# Patient Record
Sex: MALE | Race: WHITE | NOT HISPANIC OR LATINO | Employment: FULL TIME | URBAN - METROPOLITAN AREA
[De-identification: names, ages, dates, MRNs, and addresses within clinical notes are randomized per-mention and may not be internally consistent; named-entity substitution may affect disease eponyms.]

---

## 2021-02-10 ENCOUNTER — HOSPITAL ENCOUNTER (OUTPATIENT)
Dept: LAB | Age: 66
Discharge: HOME | End: 2021-02-10
Attending: ORTHOPAEDIC SURGERY

## 2021-02-10 DIAGNOSIS — G56.01 CARPAL TUNNEL SYNDROME ON RIGHT: Primary | ICD-10-CM

## 2021-02-10 DIAGNOSIS — G56.02 CARPAL TUNNEL SYNDROME ON LEFT: ICD-10-CM

## 2021-02-10 LAB
CRP SERPL-MCNC: <0.3 MG/DL
DEPRECATED RDW RBC: 47.2 FL (ref 39–50)
ERYTHROCYTE [DISTWIDTH] IN BLOOD: 13.6 % (ref 11–15)
ERYTHROCYTE [SEDIMENTATION RATE] IN BLOOD BY WESTERGREN METHOD: 23 MM/HR (ref 0–20)
HCT VFR BLD CALC: 45.6 % (ref 39–51)
HGB BLD-MCNC: 14.4 G/DL (ref 13–17)
MCH RBC QN AUTO: 29.8 PG (ref 26–34)
MCHC RBC AUTO-ENTMCNC: 31.6 G/DL (ref 32–36.5)
MCV RBC AUTO: 94.4 FL (ref 78–100)
NRBC BLD MANUAL-RTO: 0 /100 WBC
PLATELET # BLD AUTO: 181 K/MCL (ref 140–450)
RBC # BLD: 4.83 MIL/MCL (ref 4.5–5.9)
RHEUMATOID FACT SER NEPH-ACNC: <10 UNITS/ML
TSH SERPL-ACNC: 2.23 MCUNITS/ML (ref 0.35–5)
WBC # BLD: 6.8 K/MCL (ref 4.2–11)

## 2021-02-10 PROCEDURE — 86431 RHEUMATOID FACTOR QUANT: CPT | Performed by: ORTHOPAEDIC SURGERY

## 2021-02-10 PROCEDURE — 36415 COLL VENOUS BLD VENIPUNCTURE: CPT | Performed by: ORTHOPAEDIC SURGERY

## 2021-02-10 PROCEDURE — 85027 COMPLETE CBC AUTOMATED: CPT | Performed by: ORTHOPAEDIC SURGERY

## 2021-02-10 PROCEDURE — 86060 ANTISTREPTOLYSIN O TITER: CPT | Performed by: ORTHOPAEDIC SURGERY

## 2021-02-10 PROCEDURE — 84443 ASSAY THYROID STIM HORMONE: CPT | Performed by: ORTHOPAEDIC SURGERY

## 2021-02-10 PROCEDURE — 86618 LYME DISEASE ANTIBODY: CPT | Performed by: ORTHOPAEDIC SURGERY

## 2021-02-10 PROCEDURE — 81374 HLA I TYPING 1 ANTIGEN LR: CPT | Performed by: ORTHOPAEDIC SURGERY

## 2021-02-10 PROCEDURE — 86038 ANTINUCLEAR ANTIBODIES: CPT | Performed by: ORTHOPAEDIC SURGERY

## 2021-02-10 PROCEDURE — 86200 CCP ANTIBODY: CPT | Performed by: ORTHOPAEDIC SURGERY

## 2021-02-10 PROCEDURE — 86140 C-REACTIVE PROTEIN: CPT | Performed by: ORTHOPAEDIC SURGERY

## 2021-02-10 PROCEDURE — 85652 RBC SED RATE AUTOMATED: CPT | Performed by: ORTHOPAEDIC SURGERY

## 2021-02-11 LAB
B BURGDOR IGG+IGM SER QL IA: NEGATIVE
DATE OF ANALYSIS SPEC: NORMAL
HLA-B27 RELATED AG QL: NEGATIVE
SERVICE CMNT-IMP: NORMAL

## 2021-02-12 LAB
ANA SER QL IA: NEGATIVE
ASO AB TITR SER LA: <100 IUNITS/ML
CCP AB SER IA-ACNC: 13 UNITS

## 2021-02-22 ENCOUNTER — TELEPHONE (OUTPATIENT)
Dept: SCHEDULING | Age: 66
End: 2021-02-22

## 2021-03-14 ENCOUNTER — APPOINTMENT (OUTPATIENT)
Dept: LAB | Age: 66
End: 2021-03-14

## 2021-03-27 ENCOUNTER — HOSPITAL ENCOUNTER (OUTPATIENT)
Dept: ULTRASOUND IMAGING | Age: 66
Discharge: HOME | End: 2021-03-27
Attending: FAMILY MEDICINE

## 2021-03-27 DIAGNOSIS — R10.9 ABDOMINAL PAIN: ICD-10-CM

## 2021-03-27 PROCEDURE — 76705 ECHO EXAM OF ABDOMEN: CPT

## 2021-10-17 ENCOUNTER — APPOINTMENT (OUTPATIENT)
Dept: CT IMAGING | Age: 66
End: 2021-10-17
Attending: EMERGENCY MEDICINE

## 2021-10-17 ENCOUNTER — HOSPITAL ENCOUNTER (OUTPATIENT)
Age: 66
Setting detail: OBSERVATION
Discharge: HOME | End: 2021-10-18
Attending: EMERGENCY MEDICINE | Admitting: INTERNAL MEDICINE

## 2021-10-17 ENCOUNTER — APPOINTMENT (OUTPATIENT)
Dept: CT IMAGING | Age: 66
End: 2021-10-17
Attending: STUDENT IN AN ORGANIZED HEALTH CARE EDUCATION/TRAINING PROGRAM

## 2021-10-17 DIAGNOSIS — S01.01XA LACERATION OF SCALP, INITIAL ENCOUNTER: Primary | ICD-10-CM

## 2021-10-17 DIAGNOSIS — S06.5XAA SUBDURAL HEMATOMA (CMD): ICD-10-CM

## 2021-10-17 DIAGNOSIS — S06.4XAA EPIDURAL HEMATOMA (CMD): ICD-10-CM

## 2021-10-17 LAB
ABO + RH BLD: NORMAL
ALBUMIN SERPL-MCNC: 3.9 G/DL (ref 3.6–5.1)
ALBUMIN/GLOB SERPL: 1 {RATIO} (ref 1–2.4)
ALP SERPL-CCNC: 93 UNITS/L (ref 45–117)
ALT SERPL-CCNC: 49 UNITS/L
ANION GAP SERPL CALC-SCNC: 11 MMOL/L (ref 10–20)
APTT PPP: 24 SEC (ref 22–30)
AST SERPL-CCNC: 30 UNITS/L
BASOPHILS # BLD: 0 K/MCL (ref 0–0.3)
BASOPHILS NFR BLD: 0 %
BILIRUB SERPL-MCNC: 0.5 MG/DL (ref 0.2–1)
BLD GP AB SCN SERPL QL GEL: NEGATIVE
BUN SERPL-MCNC: 21 MG/DL (ref 6–20)
BUN/CREAT SERPL: 25 (ref 7–25)
CALCIUM SERPL-MCNC: 9.3 MG/DL (ref 8.4–10.2)
CHLORIDE SERPL-SCNC: 100 MMOL/L (ref 98–107)
CO2 SERPL-SCNC: 27 MMOL/L (ref 21–32)
CREAT SERPL-MCNC: 0.85 MG/DL (ref 0.67–1.17)
DEPRECATED RDW RBC: 44.6 FL (ref 39–50)
EOSINOPHIL # BLD: 0.2 K/MCL (ref 0–0.5)
EOSINOPHIL NFR BLD: 2 %
ERYTHROCYTE [DISTWIDTH] IN BLOOD: 13.1 % (ref 11–15)
FASTING DURATION TIME PATIENT: ABNORMAL H
GFR SERPLBLD BASED ON 1.73 SQ M-ARVRAT: 90 ML/MIN
GLOBULIN SER-MCNC: 3.9 G/DL (ref 2–4)
GLUCOSE BLDC GLUCOMTR-MCNC: 154 MG/DL (ref 70–99)
GLUCOSE SERPL-MCNC: 220 MG/DL (ref 70–99)
HBA1C MFR BLD: 7.5 % (ref 4.5–5.6)
HCT VFR BLD CALC: 48.2 % (ref 39–51)
HGB BLD-MCNC: 15.8 G/DL (ref 13–17)
IMM GRANULOCYTES # BLD AUTO: 0.1 K/MCL (ref 0–0.2)
IMM GRANULOCYTES # BLD: 1 %
INR PPP: 1
LYMPHOCYTES # BLD: 1 K/MCL (ref 1–4)
LYMPHOCYTES NFR BLD: 11 %
MCH RBC QN AUTO: 30.6 PG (ref 26–34)
MCHC RBC AUTO-ENTMCNC: 32.8 G/DL (ref 32–36.5)
MCV RBC AUTO: 93.4 FL (ref 78–100)
MONOCYTES # BLD: 0.6 K/MCL (ref 0.3–0.9)
MONOCYTES NFR BLD: 7 %
NEUTROPHILS # BLD: 6.7 K/MCL (ref 1.8–7.7)
NEUTROPHILS NFR BLD: 79 %
NRBC BLD MANUAL-RTO: 0 /100 WBC
PLATELET # BLD AUTO: 167 K/MCL (ref 140–450)
POTASSIUM SERPL-SCNC: 4.3 MMOL/L (ref 3.4–5.1)
PROT SERPL-MCNC: 7.8 G/DL (ref 6.4–8.2)
PROTHROMBIN TIME: 10.7 SEC (ref 9.7–11.8)
RAINBOW EXTRA TUBES HOLD SPECIMEN: NORMAL
RBC # BLD: 5.16 MIL/MCL (ref 4.5–5.9)
SARS-COV-2 RNA RESP QL NAA+PROBE: NOT DETECTED
SERVICE CMNT-IMP: NORMAL
SERVICE CMNT-IMP: NORMAL
SODIUM SERPL-SCNC: 134 MMOL/L (ref 135–145)
TYPE AND SCREEN EXPIRATION DATE: NORMAL
WBC # BLD: 8.5 K/MCL (ref 4.2–11)

## 2021-10-17 PROCEDURE — 99284 EMERGENCY DEPT VISIT MOD MDM: CPT

## 2021-10-17 PROCEDURE — 12004 RPR S/N/AX/GEN/TRK7.6-12.5CM: CPT | Performed by: STUDENT IN AN ORGANIZED HEALTH CARE EDUCATION/TRAINING PROGRAM

## 2021-10-17 PROCEDURE — 36415 COLL VENOUS BLD VENIPUNCTURE: CPT

## 2021-10-17 PROCEDURE — 85730 THROMBOPLASTIN TIME PARTIAL: CPT | Performed by: STUDENT IN AN ORGANIZED HEALTH CARE EDUCATION/TRAINING PROGRAM

## 2021-10-17 PROCEDURE — 90471 IMMUNIZATION ADMIN: CPT | Performed by: STUDENT IN AN ORGANIZED HEALTH CARE EDUCATION/TRAINING PROGRAM

## 2021-10-17 PROCEDURE — 70450 CT HEAD/BRAIN W/O DYE: CPT

## 2021-10-17 PROCEDURE — 85610 PROTHROMBIN TIME: CPT | Performed by: STUDENT IN AN ORGANIZED HEALTH CARE EDUCATION/TRAINING PROGRAM

## 2021-10-17 PROCEDURE — 83036 HEMOGLOBIN GLYCOSYLATED A1C: CPT | Performed by: INTERNAL MEDICINE

## 2021-10-17 PROCEDURE — 99220 INITIAL OBSERVATION CARE,LEVL III: CPT | Performed by: INTERNAL MEDICINE

## 2021-10-17 PROCEDURE — 10002800 HB RX 250 W HCPCS: Performed by: STUDENT IN AN ORGANIZED HEALTH CARE EDUCATION/TRAINING PROGRAM

## 2021-10-17 PROCEDURE — G1004 CDSM NDSC: HCPCS

## 2021-10-17 PROCEDURE — 85025 COMPLETE CBC W/AUTO DIFF WBC: CPT | Performed by: STUDENT IN AN ORGANIZED HEALTH CARE EDUCATION/TRAINING PROGRAM

## 2021-10-17 PROCEDURE — G0378 HOSPITAL OBSERVATION PER HR: HCPCS

## 2021-10-17 PROCEDURE — 99205 OFFICE O/P NEW HI 60 MIN: CPT | Performed by: PHYSICIAN ASSISTANT

## 2021-10-17 PROCEDURE — 82962 GLUCOSE BLOOD TEST: CPT

## 2021-10-17 PROCEDURE — 90715 TDAP VACCINE 7 YRS/> IM: CPT | Performed by: STUDENT IN AN ORGANIZED HEALTH CARE EDUCATION/TRAINING PROGRAM

## 2021-10-17 PROCEDURE — 12004 RPR S/N/AX/GEN/TRK7.6-12.5CM: CPT

## 2021-10-17 PROCEDURE — 10002803 HB RX 637: Performed by: INTERNAL MEDICINE

## 2021-10-17 PROCEDURE — 10004651 HB RX, NO CHARGE ITEM: Performed by: INTERNAL MEDICINE

## 2021-10-17 PROCEDURE — 99284 EMERGENCY DEPT VISIT MOD MDM: CPT | Performed by: STUDENT IN AN ORGANIZED HEALTH CARE EDUCATION/TRAINING PROGRAM

## 2021-10-17 PROCEDURE — 80053 COMPREHEN METABOLIC PANEL: CPT | Performed by: STUDENT IN AN ORGANIZED HEALTH CARE EDUCATION/TRAINING PROGRAM

## 2021-10-17 PROCEDURE — U0003 INFECTIOUS AGENT DETECTION BY NUCLEIC ACID (DNA OR RNA); SEVERE ACUTE RESPIRATORY SYNDROME CORONAVIRUS 2 (SARS-COV-2) (CORONAVIRUS DISEASE [COVID-19]), AMPLIFIED PROBE TECHNIQUE, MAKING USE OF HIGH THROUGHPUT TECHNOLOGIES AS DESCRIBED BY CMS-2020-01-R: HCPCS | Performed by: STUDENT IN AN ORGANIZED HEALTH CARE EDUCATION/TRAINING PROGRAM

## 2021-10-17 PROCEDURE — 86850 RBC ANTIBODY SCREEN: CPT | Performed by: STUDENT IN AN ORGANIZED HEALTH CARE EDUCATION/TRAINING PROGRAM

## 2021-10-17 RX ORDER — EZETIMIBE 10 MG/1
10 TABLET ORAL AT BEDTIME
COMMUNITY

## 2021-10-17 RX ORDER — DEXTROSE MONOHYDRATE 25 G/50ML
12.5 INJECTION, SOLUTION INTRAVENOUS PRN
Status: DISCONTINUED | OUTPATIENT
Start: 2021-10-17 | End: 2021-10-18 | Stop reason: HOSPADM

## 2021-10-17 RX ORDER — METOPROLOL SUCCINATE 100 MG/1
100 TABLET, EXTENDED RELEASE ORAL DAILY
Status: DISCONTINUED | OUTPATIENT
Start: 2021-10-17 | End: 2021-10-18 | Stop reason: HOSPADM

## 2021-10-17 RX ORDER — DEXTROSE MONOHYDRATE 25 G/50ML
25 INJECTION, SOLUTION INTRAVENOUS PRN
Status: DISCONTINUED | OUTPATIENT
Start: 2021-10-17 | End: 2021-10-18 | Stop reason: HOSPADM

## 2021-10-17 RX ORDER — NICOTINE POLACRILEX 4 MG
15 LOZENGE BUCCAL PRN
Status: DISCONTINUED | OUTPATIENT
Start: 2021-10-17 | End: 2021-10-18 | Stop reason: HOSPADM

## 2021-10-17 RX ORDER — 0.9 % SODIUM CHLORIDE 0.9 %
2 VIAL (ML) INJECTION EVERY 12 HOURS SCHEDULED
Status: DISCONTINUED | OUTPATIENT
Start: 2021-10-17 | End: 2021-10-18 | Stop reason: HOSPADM

## 2021-10-17 RX ORDER — LIDOCAINE HYDROCHLORIDE AND EPINEPHRINE 10; 10 MG/ML; UG/ML
10 INJECTION, SOLUTION INFILTRATION; PERINEURAL ONCE
Status: DISCONTINUED | OUTPATIENT
Start: 2021-10-17 | End: 2021-10-18 | Stop reason: HOSPADM

## 2021-10-17 RX ORDER — METOPROLOL SUCCINATE 100 MG/1
100 TABLET, EXTENDED RELEASE ORAL DAILY
COMMUNITY

## 2021-10-17 RX ORDER — EZETIMIBE 10 MG/1
10 TABLET ORAL AT BEDTIME
Status: DISCONTINUED | OUTPATIENT
Start: 2021-10-17 | End: 2021-10-18 | Stop reason: HOSPADM

## 2021-10-17 RX ORDER — LISINOPRIL 20 MG/1
20 TABLET ORAL DAILY
COMMUNITY

## 2021-10-17 RX ORDER — NICOTINE POLACRILEX 4 MG
30 LOZENGE BUCCAL PRN
Status: DISCONTINUED | OUTPATIENT
Start: 2021-10-17 | End: 2021-10-18 | Stop reason: HOSPADM

## 2021-10-17 RX ORDER — ASPIRIN 81 MG/1
81 TABLET, CHEWABLE ORAL DAILY
Status: ON HOLD | COMMUNITY
End: 2021-10-18 | Stop reason: HOSPADM

## 2021-10-17 RX ORDER — ACETAMINOPHEN 325 MG/1
650 TABLET ORAL EVERY 4 HOURS PRN
Status: DISCONTINUED | OUTPATIENT
Start: 2021-10-17 | End: 2021-10-18 | Stop reason: HOSPADM

## 2021-10-17 RX ORDER — HYDRALAZINE HYDROCHLORIDE 20 MG/ML
10 INJECTION INTRAMUSCULAR; INTRAVENOUS EVERY 6 HOURS PRN
Status: DISCONTINUED | OUTPATIENT
Start: 2021-10-17 | End: 2021-10-18 | Stop reason: HOSPADM

## 2021-10-17 RX ORDER — LISINOPRIL 20 MG/1
20 TABLET ORAL DAILY
Status: DISCONTINUED | OUTPATIENT
Start: 2021-10-17 | End: 2021-10-18 | Stop reason: HOSPADM

## 2021-10-17 RX ORDER — HYDROCODONE BITARTRATE AND ACETAMINOPHEN 5; 325 MG/1; MG/1
1 TABLET ORAL EVERY 4 HOURS PRN
Status: DISCONTINUED | OUTPATIENT
Start: 2021-10-17 | End: 2021-10-18 | Stop reason: HOSPADM

## 2021-10-17 RX ORDER — FLUOCINONIDE 0.5 MG/G
1 OINTMENT TOPICAL 2 TIMES DAILY
COMMUNITY

## 2021-10-17 RX ADMIN — SODIUM CHLORIDE, PRESERVATIVE FREE 2 ML: 5 INJECTION INTRAVENOUS at 18:16

## 2021-10-17 RX ADMIN — SODIUM CHLORIDE, PRESERVATIVE FREE 2 ML: 5 INJECTION INTRAVENOUS at 20:50

## 2021-10-17 RX ADMIN — TETANUS TOXOID, REDUCED DIPHTHERIA TOXOID AND ACELLULAR PERTUSSIS VACCINE, ADSORBED 0.5 ML: 5; 2.5; 8; 8; 2.5 SUSPENSION INTRAMUSCULAR at 10:45

## 2021-10-17 RX ADMIN — HYDROCODONE BITARTRATE AND ACETAMINOPHEN 1 TABLET: 5; 325 TABLET ORAL at 23:45

## 2021-10-17 RX ADMIN — HYDROCODONE BITARTRATE AND ACETAMINOPHEN 1 TABLET: 5; 325 TABLET ORAL at 17:00

## 2021-10-17 RX ADMIN — ACETAMINOPHEN 650 MG: 325 TABLET ORAL at 14:45

## 2021-10-17 RX ADMIN — EZETIMIBE 10 MG: 10 TABLET ORAL at 20:47

## 2021-10-17 ASSESSMENT — COLUMBIA-SUICIDE SEVERITY RATING SCALE - C-SSRS
2. HAVE YOU ACTUALLY HAD ANY THOUGHTS OF KILLING YOURSELF?: NO
IS THE PATIENT ABLE TO COMPLETE C-SSRS: YES
1. WITHIN THE PAST MONTH, HAVE YOU WISHED YOU WERE DEAD OR WISHED YOU COULD GO TO SLEEP AND NOT WAKE UP?: NO
6. HAVE YOU EVER DONE ANYTHING, STARTED TO DO ANYTHING, OR PREPARED TO DO ANYTHING TO END YOUR LIFE?: NO

## 2021-10-17 ASSESSMENT — ENCOUNTER SYMPTOMS
NAUSEA: 0
BRUISES/BLEEDS EASILY: 0
DIZZINESS: 0
EYE PAIN: 0
ACTIVITY CHANGE: 0
PHOTOPHOBIA: 0
DIARRHEA: 0
WOUND: 1
FATIGUE: 0
CHILLS: 0
VOMITING: 0
SORE THROAT: 0
ROS SKIN COMMENTS: LACERATION
CHEST TIGHTNESS: 0
AGITATION: 0
FEVER: 0
CONFUSION: 0
HEADACHES: 1
ABDOMINAL PAIN: 0
CONSTIPATION: 0
NUMBNESS: 0
COLOR CHANGE: 0
WHEEZING: 0
SLEEP DISTURBANCE: 0
SHORTNESS OF BREATH: 0
BACK PAIN: 0
HEADACHES: 0
WEAKNESS: 0

## 2021-10-17 ASSESSMENT — LIFESTYLE VARIABLES
ALCOHOL_USE_STATUS: NO OR LOW RISK WITH VALIDATED TOOL
HOW MANY STANDARD DRINKS CONTAINING ALCOHOL DO YOU HAVE ON A TYPICAL DAY: 0,1 OR 2
AUDIT-C TOTAL SCORE: 0
HOW OFTEN DO YOU HAVE A DRINK CONTAINING ALCOHOL: NEVER
HOW OFTEN DO YOU HAVE 6 OR MORE DRINKS ON ONE OCCASION: NEVER

## 2021-10-17 ASSESSMENT — PATIENT HEALTH QUESTIONNAIRE - PHQ9
2. FEELING DOWN, DEPRESSED OR HOPELESS: NOT AT ALL
SUM OF ALL RESPONSES TO PHQ9 QUESTIONS 1 AND 2: 0
CLINICAL INTERPRETATION OF PHQ2 SCORE: NO FURTHER SCREENING NEEDED
IS PATIENT ABLE TO COMPLETE PHQ2 OR PHQ9: YES
CLINICAL INTERPRETATION OF PHQ9 SCORE: NO FURTHER SCREENING NEEDED
SUM OF ALL RESPONSES TO PHQ9 QUESTIONS 1 AND 2: 0
1. LITTLE INTEREST OR PLEASURE IN DOING THINGS: NOT AT ALL

## 2021-10-17 ASSESSMENT — COGNITIVE AND FUNCTIONAL STATUS - GENERAL
DO YOU HAVE SERIOUS DIFFICULTY WALKING OR CLIMBING STAIRS: NO
BECAUSE OF A PHYSICAL, MENTAL, OR EMOTIONAL CONDITION, DO YOU HAVE SERIOUS DIFFICULTY CONCENTRATING, REMEMBERING OR MAKING DECISIONS: NO
ARE YOU DEAF OR DO YOU HAVE SERIOUS DIFFICULTY  HEARING: NO
ARE YOU BLIND OR DO YOU HAVE SERIOUS DIFFICULTY SEEING, EVEN WHEN WEARING GLASSES: NO
DO YOU HAVE DIFFICULTY DRESSING OR BATHING: NO
BECAUSE OF A PHYSICAL, MENTAL, OR EMOTIONAL CONDITION, DO YOU HAVE DIFFICULTY DOING ERRANDS ALONE: NO

## 2021-10-17 ASSESSMENT — PAIN SCALES - GENERAL
PAINLEVEL_OUTOF10: 3
PAINLEVEL_OUTOF10: 6
PAINLEVEL_OUTOF10: 4
PAINLEVEL_OUTOF10: 0
PAINLEVEL_OUTOF10: 5
PAINLEVEL_OUTOF10: 6
PAINLEVEL_OUTOF10: 5

## 2021-10-17 ASSESSMENT — ACTIVITIES OF DAILY LIVING (ADL)
ADL_SHORT_OF_BREATH: NO
ADL_BEFORE_ADMISSION: INDEPENDENT
ADL_SCORE: 12
RECENT_DECLINE_ADL: NO
CHRONIC_PAIN_PRESENT: NO

## 2021-10-18 VITALS
HEIGHT: 65 IN | OXYGEN SATURATION: 92 % | WEIGHT: 182.1 LBS | TEMPERATURE: 98.4 F | DIASTOLIC BLOOD PRESSURE: 75 MMHG | RESPIRATION RATE: 18 BRPM | SYSTOLIC BLOOD PRESSURE: 122 MMHG | HEART RATE: 72 BPM | BODY MASS INDEX: 30.34 KG/M2

## 2021-10-18 DIAGNOSIS — S06.5XAA SDH (SUBDURAL HEMATOMA) (CMD): Primary | ICD-10-CM

## 2021-10-18 PROBLEM — S01.01XA SCALP LACERATION: Status: ACTIVE | Noted: 2021-10-18

## 2021-10-18 PROBLEM — S06.4XAA EPIDURAL HEMATOMA (CMD): Status: ACTIVE | Noted: 2021-10-18

## 2021-10-18 PROBLEM — E11.9 DIABETES MELLITUS, NEW ONSET (CMD): Status: ACTIVE | Noted: 2021-10-18

## 2021-10-18 PROBLEM — S06.4XAA EPIDURAL HEMATOMA (CMD): Status: RESOLVED | Noted: 2021-10-18 | Resolved: 2021-10-18

## 2021-10-18 PROBLEM — I10 ESSENTIAL HYPERTENSION, BENIGN: Status: ACTIVE | Noted: 2021-10-18

## 2021-10-18 LAB
BASOPHILS # BLD: 0 K/MCL (ref 0–0.3)
BASOPHILS NFR BLD: 0 %
DEPRECATED RDW RBC: 44.1 FL (ref 39–50)
EOSINOPHIL # BLD: 0.2 K/MCL (ref 0–0.5)
EOSINOPHIL NFR BLD: 2 %
ERYTHROCYTE [DISTWIDTH] IN BLOOD: 13.2 % (ref 11–15)
GLUCOSE BLDC GLUCOMTR-MCNC: 134 MG/DL (ref 70–99)
GLUCOSE BLDC GLUCOMTR-MCNC: 200 MG/DL (ref 70–99)
HCT VFR BLD CALC: 41.8 % (ref 39–51)
HGB BLD-MCNC: 13.8 G/DL (ref 13–17)
IMM GRANULOCYTES # BLD AUTO: 0 K/MCL (ref 0–0.2)
IMM GRANULOCYTES # BLD: 1 %
LYMPHOCYTES # BLD: 1.1 K/MCL (ref 1–4)
LYMPHOCYTES NFR BLD: 14 %
MCH RBC QN AUTO: 30.4 PG (ref 26–34)
MCHC RBC AUTO-ENTMCNC: 33 G/DL (ref 32–36.5)
MCV RBC AUTO: 92.1 FL (ref 78–100)
MONOCYTES # BLD: 0.6 K/MCL (ref 0.3–0.9)
MONOCYTES NFR BLD: 8 %
NEUTROPHILS # BLD: 5.9 K/MCL (ref 1.8–7.7)
NEUTROPHILS NFR BLD: 75 %
NRBC BLD MANUAL-RTO: 0 /100 WBC
PLATELET # BLD AUTO: 154 K/MCL (ref 140–450)
RAINBOW EXTRA TUBES HOLD SPECIMEN: NORMAL
RBC # BLD: 4.54 MIL/MCL (ref 4.5–5.9)
WBC # BLD: 7.9 K/MCL (ref 4.2–11)

## 2021-10-18 PROCEDURE — 36415 COLL VENOUS BLD VENIPUNCTURE: CPT | Performed by: INTERNAL MEDICINE

## 2021-10-18 PROCEDURE — 10004651 HB RX, NO CHARGE ITEM: Performed by: INTERNAL MEDICINE

## 2021-10-18 PROCEDURE — 85025 COMPLETE CBC W/AUTO DIFF WBC: CPT | Performed by: INTERNAL MEDICINE

## 2021-10-18 PROCEDURE — 10002803 HB RX 637: Performed by: STUDENT IN AN ORGANIZED HEALTH CARE EDUCATION/TRAINING PROGRAM

## 2021-10-18 PROCEDURE — 99217 OBSERVATION CARE DISCHARGE: CPT | Performed by: STUDENT IN AN ORGANIZED HEALTH CARE EDUCATION/TRAINING PROGRAM

## 2021-10-18 PROCEDURE — 99225 SUBSEQUENT OBSERVATION CARE LEVEL II: CPT | Performed by: PHYSICIAN ASSISTANT

## 2021-10-18 PROCEDURE — 10002803 HB RX 637: Performed by: INTERNAL MEDICINE

## 2021-10-18 PROCEDURE — G0378 HOSPITAL OBSERVATION PER HR: HCPCS

## 2021-10-18 PROCEDURE — 82962 GLUCOSE BLOOD TEST: CPT

## 2021-10-18 RX ORDER — ACETAMINOPHEN 325 MG/1
650 TABLET ORAL EVERY 4 HOURS PRN
Qty: 30 TABLET | Refills: 0 | Status: SHIPPED | OUTPATIENT
Start: 2021-10-18

## 2021-10-18 RX ADMIN — METOPROLOL SUCCINATE 100 MG: 100 TABLET, EXTENDED RELEASE ORAL at 08:25

## 2021-10-18 RX ADMIN — LISINOPRIL 20 MG: 20 TABLET ORAL at 08:25

## 2021-10-18 RX ADMIN — SODIUM CHLORIDE, PRESERVATIVE FREE 2 ML: 5 INJECTION INTRAVENOUS at 08:26

## 2021-10-18 RX ADMIN — METFORMIN HYDROCHLORIDE 500 MG: 500 TABLET, FILM COATED ORAL at 10:04

## 2021-10-18 ASSESSMENT — ENCOUNTER SYMPTOMS
CHILLS: 0
VOMITING: 0
BACK PAIN: 0
WEAKNESS: 0
NUMBNESS: 0
FEVER: 0
SHORTNESS OF BREATH: 0
NAUSEA: 0
WOUND: 1
HEADACHES: 0

## 2021-10-18 ASSESSMENT — PAIN SCALES - GENERAL: PAINLEVEL_OUTOF10: 0

## 2021-10-23 ENCOUNTER — TELEPHONE (OUTPATIENT)
Dept: SCHEDULING | Age: 66
End: 2021-10-23

## 2021-10-26 ENCOUNTER — TELEPHONE (OUTPATIENT)
Dept: NEUROSURGERY | Age: 66
End: 2021-10-26

## 2021-10-28 ENCOUNTER — HOSPITAL ENCOUNTER (OUTPATIENT)
Dept: LAB | Age: 66
Discharge: HOME | End: 2021-10-28
Attending: ORTHOPAEDIC SURGERY

## 2021-10-28 DIAGNOSIS — L40.9 PSORIASIS: ICD-10-CM

## 2021-10-28 DIAGNOSIS — Z79.899 DRUG THERAPY: ICD-10-CM

## 2021-10-28 DIAGNOSIS — L40.9 PSORIASIS: Primary | ICD-10-CM

## 2021-10-28 LAB
ALBUMIN SERPL-MCNC: 3.8 G/DL (ref 3.6–5.1)
ALBUMIN/GLOB SERPL: 1 {RATIO} (ref 1–2.4)
ALP SERPL-CCNC: 75 UNITS/L (ref 45–117)
ALT SERPL-CCNC: 34 UNITS/L
ANION GAP SERPL CALC-SCNC: 8 MMOL/L (ref 10–20)
AST SERPL-CCNC: 24 UNITS/L
BASOPHILS # BLD: 0 K/MCL (ref 0–0.3)
BASOPHILS NFR BLD: 0 %
BILIRUB SERPL-MCNC: 0.3 MG/DL (ref 0.2–1)
BUN SERPL-MCNC: 26 MG/DL (ref 6–20)
BUN/CREAT SERPL: 31 (ref 7–25)
CALCIUM SERPL-MCNC: 9.4 MG/DL (ref 8.4–10.2)
CHLORIDE SERPL-SCNC: 104 MMOL/L (ref 98–107)
CO2 SERPL-SCNC: 29 MMOL/L (ref 21–32)
CREAT SERPL-MCNC: 0.85 MG/DL (ref 0.67–1.17)
DEPRECATED RDW RBC: 44.9 FL (ref 39–50)
EOSINOPHIL # BLD: 0.2 K/MCL (ref 0–0.5)
EOSINOPHIL NFR BLD: 3 %
ERYTHROCYTE [DISTWIDTH] IN BLOOD: 13.1 % (ref 11–15)
FASTING DURATION TIME PATIENT: ABNORMAL H
GFR SERPLBLD BASED ON 1.73 SQ M-ARVRAT: 90 ML/MIN
GLOBULIN SER-MCNC: 4 G/DL (ref 2–4)
GLUCOSE SERPL-MCNC: 125 MG/DL (ref 70–99)
HBV CORE IGM SER QL: NEGATIVE
HBV SURFACE AB SER QL: NEGATIVE
HBV SURFACE AG SER QL: NEGATIVE
HCT VFR BLD CALC: 42.7 % (ref 39–51)
HCV AB SER QL: NEGATIVE
HGB BLD-MCNC: 14 G/DL (ref 13–17)
IMM GRANULOCYTES # BLD AUTO: 0 K/MCL (ref 0–0.2)
IMM GRANULOCYTES # BLD: 0 %
LYMPHOCYTES # BLD: 1.2 K/MCL (ref 1–4)
LYMPHOCYTES NFR BLD: 17 %
MCH RBC QN AUTO: 30.8 PG (ref 26–34)
MCHC RBC AUTO-ENTMCNC: 32.8 G/DL (ref 32–36.5)
MCV RBC AUTO: 94.1 FL (ref 78–100)
MONOCYTES # BLD: 0.5 K/MCL (ref 0.3–0.9)
MONOCYTES NFR BLD: 7 %
NEUTROPHILS # BLD: 4.8 K/MCL (ref 1.8–7.7)
NEUTROPHILS NFR BLD: 73 %
NRBC BLD MANUAL-RTO: 0 /100 WBC
PLATELET # BLD AUTO: 196 K/MCL (ref 140–450)
POTASSIUM SERPL-SCNC: 4.8 MMOL/L (ref 3.4–5.1)
PROT SERPL-MCNC: 7.8 G/DL (ref 6.4–8.2)
RBC # BLD: 4.54 MIL/MCL (ref 4.5–5.9)
SODIUM SERPL-SCNC: 136 MMOL/L (ref 135–145)
WBC # BLD: 6.7 K/MCL (ref 4.2–11)

## 2021-10-28 PROCEDURE — 86705 HEP B CORE ANTIBODY IGM: CPT | Performed by: DERMATOLOGY

## 2021-10-28 PROCEDURE — 87340 HEPATITIS B SURFACE AG IA: CPT | Performed by: DERMATOLOGY

## 2021-10-28 PROCEDURE — 36415 COLL VENOUS BLD VENIPUNCTURE: CPT | Performed by: DERMATOLOGY

## 2021-10-28 PROCEDURE — 86706 HEP B SURFACE ANTIBODY: CPT | Performed by: DERMATOLOGY

## 2021-10-28 PROCEDURE — 86480 TB TEST CELL IMMUN MEASURE: CPT | Performed by: DERMATOLOGY

## 2021-10-28 PROCEDURE — 85025 COMPLETE CBC W/AUTO DIFF WBC: CPT | Performed by: DERMATOLOGY

## 2021-10-28 PROCEDURE — 86803 HEPATITIS C AB TEST: CPT | Performed by: DERMATOLOGY

## 2021-10-28 PROCEDURE — 80053 COMPREHEN METABOLIC PANEL: CPT | Performed by: DERMATOLOGY

## 2021-10-29 LAB
GAMMA INTERFERON BACKGROUND BLD IA-ACNC: 0.02 IU/ML
M TB IFN-G BLD-IMP: NEGATIVE
M TB IFN-G CD4+ BCKGRND COR BLD-ACNC: 0.02 IU/ML
M TB IFN-G CD4+CD8+ BCKGRND COR BLD-ACNC: 0.02 IU/ML
MITOGEN IGNF BCKGRD COR BLD-ACNC: 7.13 IU/ML

## 2021-11-01 ENCOUNTER — TELEPHONE (OUTPATIENT)
Dept: NEUROSURGERY | Age: 66
End: 2021-11-01

## 2021-11-02 ENCOUNTER — HOSPITAL ENCOUNTER (OUTPATIENT)
Dept: CT IMAGING | Age: 66
Discharge: HOME | End: 2021-11-02
Attending: INTERNAL MEDICINE

## 2021-11-02 ENCOUNTER — TELEPHONE (OUTPATIENT)
Dept: NEUROSURGERY | Age: 66
End: 2021-11-02

## 2021-11-02 DIAGNOSIS — S06.5XAA SUBDURAL HEMATOMA (CMD): ICD-10-CM

## 2021-11-02 PROCEDURE — 70450 CT HEAD/BRAIN W/O DYE: CPT

## 2021-11-02 PROCEDURE — G1004 CDSM NDSC: HCPCS

## 2021-11-05 ENCOUNTER — APPOINTMENT (OUTPATIENT)
Dept: NEUROSURGERY | Age: 66
End: 2021-11-05

## 2022-09-01 ENCOUNTER — APPOINTMENT (OUTPATIENT)
Dept: URBAN - METROPOLITAN AREA CLINIC 316 | Age: 67
Setting detail: DERMATOLOGY
End: 2022-09-01

## 2022-09-01 DIAGNOSIS — Z79.899 OTHER LONG TERM (CURRENT) DRUG THERAPY: ICD-10-CM

## 2022-09-01 DIAGNOSIS — L40.0 PSORIASIS VULGARIS: ICD-10-CM

## 2022-09-01 PROCEDURE — OTHER COUNSELING: OTHER

## 2022-09-01 PROCEDURE — 99213 OFFICE O/P EST LOW 20 MIN: CPT

## 2022-09-01 PROCEDURE — OTHER HIGH RISK MEDICATION MONITORING: OTHER

## 2022-09-01 PROCEDURE — OTHER PRESCRIPTION MEDICATION MANAGEMENT: OTHER

## 2022-09-01 ASSESSMENT — LOCATION ZONE DERM: LOCATION ZONE: ARM

## 2022-09-01 ASSESSMENT — LOCATION SIMPLE DESCRIPTION DERM
LOCATION SIMPLE: LEFT ELBOW
LOCATION SIMPLE: RIGHT ELBOW

## 2022-09-01 ASSESSMENT — LOCATION DETAILED DESCRIPTION DERM
LOCATION DETAILED: LEFT ELBOW
LOCATION DETAILED: RIGHT ELBOW

## 2022-09-01 NOTE — PROCEDURE: HIGH RISK MEDICATION MONITORING
Yes/MINE Hummel Valtrex Counseling: I discussed with the patient the risks of valacyclovir including but not limited to kidney damage, nausea, vomiting and severe allergy.  The patient understands that if the infection seems to be worsening or is not improving, they are to call.

## 2022-09-01 NOTE — PROCEDURE: PRESCRIPTION MEDICATION MANAGEMENT
Render In Strict Bullet Format?: No
Detail Level: Zone
Continue Regimen: MTX 2.5mg, 3 tabs Am 3 tabs PM Thursdays ONLY \\nFolic Acid 1mg, daily EXCEPT Thursdays \\nCalcipotriene Sat-Sun\\n Clobetasol M-F

## 2023-03-08 ENCOUNTER — APPOINTMENT (OUTPATIENT)
Dept: URBAN - METROPOLITAN AREA CLINIC 316 | Age: 68
Setting detail: DERMATOLOGY
End: 2023-03-08

## 2023-03-08 DIAGNOSIS — L40.0 PSORIASIS VULGARIS: ICD-10-CM

## 2023-03-08 DIAGNOSIS — Z79.899 OTHER LONG TERM (CURRENT) DRUG THERAPY: ICD-10-CM

## 2023-03-08 PROCEDURE — 99214 OFFICE O/P EST MOD 30 MIN: CPT

## 2023-03-08 PROCEDURE — OTHER HIGH RISK MEDICATION MONITORING: OTHER

## 2023-03-08 PROCEDURE — OTHER COUNSELING: OTHER

## 2023-03-08 PROCEDURE — OTHER PRESCRIPTION MEDICATION MANAGEMENT: OTHER

## 2023-03-08 PROCEDURE — OTHER ORDER TESTS: OTHER

## 2023-03-08 ASSESSMENT — LOCATION DETAILED DESCRIPTION DERM
LOCATION DETAILED: RIGHT ELBOW
LOCATION DETAILED: LEFT ELBOW

## 2023-03-08 ASSESSMENT — LOCATION ZONE DERM: LOCATION ZONE: ARM

## 2023-03-08 ASSESSMENT — LOCATION SIMPLE DESCRIPTION DERM
LOCATION SIMPLE: RIGHT ELBOW
LOCATION SIMPLE: LEFT ELBOW

## 2023-03-08 NOTE — PROCEDURE: HIGH RISK MEDICATION MONITORING
Cheek Interpolation Flap Division And Inset Text: Division and inset of the cheek interpolation flap was performed to achieve optimal aesthetic result, restore normal anatomic appearance and avoid distortion of normal anatomy, expedite and facilitate wound healing, achieve optimal functional result and because linear closure either not possible or would produce suboptimal result. The patient was prepped and draped in the usual manner. The pedicle was infiltrated with local anesthesia. The pedicle was sectioned with a #15 blade. The pedicle was de-bulked and trimmed to match the shape of the defect. Hemostasis was achieved. The flap donor site and free margin of the flap were secured with deep buried sutures and the wound edges were re-approximated. Xelpatricz Pregnancy And Lactation Text: This medication is Pregnancy Category D and is not considered safe during pregnancy.  The risk during breast feeding is also uncertain.

## 2023-04-05 ENCOUNTER — HOSPITAL ENCOUNTER (OUTPATIENT)
Dept: LAB | Age: 68
Discharge: HOME | End: 2023-04-05
Attending: DERMATOLOGY

## 2023-04-05 ENCOUNTER — RX ONLY (RX ONLY)
Age: 68
End: 2023-04-05

## 2023-04-05 DIAGNOSIS — L40.0 PSORIASIS VULGARIS: Primary | ICD-10-CM

## 2023-04-05 LAB
ALBUMIN SERPL-MCNC: 4 G/DL (ref 3.6–5.1)
ALBUMIN/GLOB SERPL: 1.2 {RATIO} (ref 1–2.4)
ALP SERPL-CCNC: 76 UNITS/L (ref 45–117)
ALT SERPL-CCNC: 35 UNITS/L
ANION GAP SERPL CALC-SCNC: 6 MMOL/L (ref 7–19)
AST SERPL-CCNC: 20 UNITS/L
BASOPHILS # BLD: 0 K/MCL (ref 0–0.3)
BASOPHILS NFR BLD: 0 %
BILIRUB SERPL-MCNC: 0.6 MG/DL (ref 0.2–1)
BUN SERPL-MCNC: 29 MG/DL (ref 6–20)
BUN/CREAT SERPL: 29 (ref 7–25)
CALCIUM SERPL-MCNC: 10.2 MG/DL (ref 8.4–10.2)
CHLORIDE SERPL-SCNC: 106 MMOL/L (ref 97–110)
CO2 SERPL-SCNC: 30 MMOL/L (ref 21–32)
CREAT SERPL-MCNC: 1.01 MG/DL (ref 0.67–1.17)
DEPRECATED RDW RBC: 45.3 FL (ref 39–50)
EOSINOPHIL # BLD: 0.2 K/MCL (ref 0–0.5)
EOSINOPHIL NFR BLD: 3 %
ERYTHROCYTE [DISTWIDTH] IN BLOOD: 13.8 % (ref 11–15)
FASTING DURATION TIME PATIENT: ABNORMAL H
GFR SERPLBLD BASED ON 1.73 SQ M-ARVRAT: 81 ML/MIN
GLOBULIN SER-MCNC: 3.4 G/DL (ref 2–4)
GLUCOSE SERPL-MCNC: 152 MG/DL (ref 70–99)
HCT VFR BLD CALC: 44 % (ref 39–51)
HGB BLD-MCNC: 14.5 G/DL (ref 13–17)
IMM GRANULOCYTES # BLD AUTO: 0 K/MCL (ref 0–0.2)
IMM GRANULOCYTES # BLD: 0 %
LYMPHOCYTES # BLD: 1.1 K/MCL (ref 1–4)
LYMPHOCYTES NFR BLD: 16 %
MCH RBC QN AUTO: 29.7 PG (ref 26–34)
MCHC RBC AUTO-ENTMCNC: 33 G/DL (ref 32–36.5)
MCV RBC AUTO: 90 FL (ref 78–100)
MONOCYTES # BLD: 0.5 K/MCL (ref 0.3–0.9)
MONOCYTES NFR BLD: 7 %
NEUTROPHILS # BLD: 5 K/MCL (ref 1.8–7.7)
NEUTROPHILS NFR BLD: 74 %
NRBC BLD MANUAL-RTO: 0 /100 WBC
PLATELET # BLD AUTO: 159 K/MCL (ref 140–450)
POTASSIUM SERPL-SCNC: 4.6 MMOL/L (ref 3.4–5.1)
PROT SERPL-MCNC: 7.4 G/DL (ref 6.4–8.2)
RBC # BLD: 4.89 MIL/MCL (ref 4.5–5.9)
SODIUM SERPL-SCNC: 137 MMOL/L (ref 135–145)
WBC # BLD: 6.7 K/MCL (ref 4.2–11)

## 2023-04-05 PROCEDURE — 80053 COMPREHEN METABOLIC PANEL: CPT | Performed by: DERMATOLOGY

## 2023-04-05 PROCEDURE — 86480 TB TEST CELL IMMUN MEASURE: CPT | Performed by: DERMATOLOGY

## 2023-04-05 PROCEDURE — 85025 COMPLETE CBC W/AUTO DIFF WBC: CPT | Performed by: DERMATOLOGY

## 2023-04-05 PROCEDURE — 36415 COLL VENOUS BLD VENIPUNCTURE: CPT | Performed by: DERMATOLOGY

## 2023-04-05 RX ORDER — CLOBETASOL PROPIONATE 0.5 MG/G
OINTMENT TOPICAL
Qty: 60 | Refills: 2 | Status: ERX | COMMUNITY
Start: 2023-04-05

## 2023-04-05 RX ORDER — METHOTREXATE SODIUM 2.5 MG/1
TABLET ORAL
Qty: 24 | Refills: 3 | Status: ERX | COMMUNITY
Start: 2023-04-05

## 2023-04-05 RX ORDER — CALCIPOTRIENE 50 UG/G
OINTMENT TOPICAL
Qty: 120 | Refills: 2 | Status: ERX

## 2023-04-05 RX ORDER — FOLIC ACID 1 MG/1
TABLET ORAL
Qty: 30 | Refills: 2 | Status: ERX | COMMUNITY
Start: 2023-04-05

## 2023-04-06 LAB
GAMMA INTERFERON BACKGROUND BLD IA-ACNC: 0.03 IU/ML
M TB IFN-G BLD-IMP: NEGATIVE
M TB IFN-G CD4+ BCKGRND COR BLD-ACNC: 0 IU/ML
M TB IFN-G CD4+CD8+ BCKGRND COR BLD-ACNC: 0.01 IU/ML
MITOGEN IGNF BCKGRD COR BLD-ACNC: 7.22 IU/ML

## 2023-05-14 NOTE — PROCEDURE: HIGH RISK MEDICATION MONITORING
Yes Cimzia Pregnancy And Lactation Text: This medication crosses the placenta but can be considered safe in certain situations. Cimzia may be excreted in breast milk.

## 2023-06-13 ENCOUNTER — EXTERNAL LAB (OUTPATIENT)
Dept: OTHER | Age: 68
End: 2023-06-13

## 2023-06-13 LAB
ALBUMIN SERPL-MCNC: 4 G/DL (ref 3.5–5)
ALP SERPL-CCNC: 74 UNITS/L (ref 34–104)
ALT SERPL-CCNC: 14 UNITS/L (ref 11–51)
ANION GAP SERPL CALC-SCNC: 6 MMOL/L (ref 4–13)
AST SERPL-CCNC: 16 UNITS/L (ref 13–39)
BILIRUB SERPL-MCNC: 0.6 MG/DL (ref 0.2–1.2)
BUN SERPL-MCNC: 22 MG/DL (ref 7–25)
CALCIUM SERPL-MCNC: 9 MG/DL (ref 8.3–10.5)
CHLORIDE SERPL-SCNC: 105 MMOL/L (ref 98–107)
CO2 SERPL-SCNC: 27 MMOL/L (ref 21–31)
CREAT SERPL-MCNC: 0.96 MG/DL (ref 0.6–1.3)
CREAT UR-MCNC: 104.4 MG/DL
GFR SERPLBLD SCHWARTZ-ARVRAT: 86 ML/MIN/1.73 M2
GLUCOSE SERPL-MCNC: 132 MG/DL (ref 70–100)
LENGTH OF FAST TIME PATIENT: ABNORMAL H
MICROALBUMIN UR-MCNC: 0.9 MG/DL (ref 0–1.8)
MICROALBUMIN/CREAT UR: 9 MG/G (ref 0–29)
POTASSIUM SERPL-SCNC: 5.3 MMOL/L (ref 3.5–5.1)
PROT SERPL-MCNC: 6.4 G/DL (ref 6.4–8.3)
SODIUM SERPL-SCNC: 138 MMOL/L (ref 133–146)

## 2023-06-28 ENCOUNTER — RX ONLY (RX ONLY)
Age: 68
End: 2023-06-28

## 2023-06-28 ENCOUNTER — APPOINTMENT (OUTPATIENT)
Dept: URBAN - METROPOLITAN AREA CLINIC 316 | Age: 68
Setting detail: DERMATOLOGY
End: 2023-06-28

## 2023-06-28 DIAGNOSIS — L40.0 PSORIASIS VULGARIS: ICD-10-CM

## 2023-06-28 DIAGNOSIS — Z79.899 OTHER LONG TERM (CURRENT) DRUG THERAPY: ICD-10-CM

## 2023-06-28 PROCEDURE — 99213 OFFICE O/P EST LOW 20 MIN: CPT

## 2023-06-28 PROCEDURE — OTHER PRESCRIPTION MEDICATION MANAGEMENT: OTHER

## 2023-06-28 PROCEDURE — OTHER HIGH RISK MEDICATION MONITORING: OTHER

## 2023-06-28 PROCEDURE — OTHER COUNSELING: OTHER

## 2023-06-28 RX ORDER — CALCIPOTRIENE 50 UG/G
OINTMENT TOPICAL
Qty: 120 | Refills: 2 | Status: ERX | COMMUNITY
Start: 2023-06-28

## 2023-06-28 RX ORDER — CLOBETASOL PROPIONATE 0.5 MG/G
OINTMENT TOPICAL
Qty: 60 | Refills: 2 | Status: ERX | COMMUNITY
Start: 2023-06-28

## 2023-06-28 ASSESSMENT — BSA PSORIASIS: % BODY COVERED IN PSORIASIS: 3

## 2023-06-28 ASSESSMENT — PGA PSORIASIS: PGA PSORIASIS 2020: MILD

## 2023-06-28 ASSESSMENT — LOCATION DETAILED DESCRIPTION DERM
LOCATION DETAILED: RIGHT ELBOW
LOCATION DETAILED: LEFT ELBOW

## 2023-06-28 ASSESSMENT — LOCATION SIMPLE DESCRIPTION DERM
LOCATION SIMPLE: LEFT ELBOW
LOCATION SIMPLE: RIGHT ELBOW

## 2023-06-28 ASSESSMENT — LOCATION ZONE DERM: LOCATION ZONE: ARM

## 2023-06-28 NOTE — PROCEDURE: HIGH RISK MEDICATION MONITORING
[Nutrition/ Exercise/ Weight Management] : nutrition, exercise, weight management [Vitamins/Supplements] : vitamins/supplements Birth Control Pills Pregnancy And Lactation Text: This medication should be avoided if pregnant and for the first 30 days post-partum.

## 2023-06-28 NOTE — PROCEDURE: HIGH RISK MEDICATION MONITORING
Pt reports she chewed 4 baby aspirin about 1 hour pta when cp started. md made aware, orders not to give asa in ed.      Ameena Deluna RN  06/14/18 0160     Azithromycin Pregnancy And Lactation Text: This medication is considered safe during pregnancy and is also secreted in breast milk.

## 2023-10-04 ENCOUNTER — APPOINTMENT (OUTPATIENT)
Dept: URBAN - METROPOLITAN AREA CLINIC 316 | Age: 68
Setting detail: DERMATOLOGY
End: 2023-10-04

## 2023-10-04 DIAGNOSIS — Z79.899 OTHER LONG TERM (CURRENT) DRUG THERAPY: ICD-10-CM

## 2023-10-04 DIAGNOSIS — L40.0 PSORIASIS VULGARIS: ICD-10-CM

## 2023-10-04 PROCEDURE — OTHER PRESCRIPTION MEDICATION MANAGEMENT: OTHER

## 2023-10-04 PROCEDURE — OTHER ADDITIONAL NOTES: OTHER

## 2023-10-04 PROCEDURE — OTHER HIGH RISK MEDICATION MONITORING: OTHER

## 2023-10-04 PROCEDURE — OTHER COUNSELING: OTHER

## 2023-10-04 PROCEDURE — 99213 OFFICE O/P EST LOW 20 MIN: CPT

## 2023-10-04 ASSESSMENT — BSA PSORIASIS: % BODY COVERED IN PSORIASIS: 5

## 2023-10-04 ASSESSMENT — LOCATION DETAILED DESCRIPTION DERM
LOCATION DETAILED: LEFT ELBOW
LOCATION DETAILED: RIGHT ELBOW

## 2023-10-04 ASSESSMENT — ITCH NUMERIC RATING SCALE: HOW SEVERE IS YOUR ITCHING?: 4

## 2023-10-04 ASSESSMENT — PGA PSORIASIS: PGA PSORIASIS 2020: MODERATE

## 2023-10-04 ASSESSMENT — LOCATION ZONE DERM: LOCATION ZONE: ARM

## 2023-10-04 ASSESSMENT — LOCATION SIMPLE DESCRIPTION DERM
LOCATION SIMPLE: LEFT ELBOW
LOCATION SIMPLE: RIGHT ELBOW

## 2023-10-04 NOTE — PROCEDURE: PRESCRIPTION MEDICATION MANAGEMENT
Render In Strict Bullet Format?: No
Detail Level: Zone
Continue Regimen: MTX 2.5mg, 3 tabs Am 3 tabs PM Thursdays ONLY \\nFolic Acid 1mg, daily EXCEPT Thursdays \\nCalcipotriene Sat-Sun\\n Clobetasol M-F
Plan: Discussed biological injection but patient deferred at this time. \\nPatient to call office if refills needed to avoid flares

## 2023-10-04 NOTE — PROCEDURE: HIGH RISK MEDICATION MONITORING
alert Libtayo Counseling- I discussed with the patient the risks of Libtayo including but not limited to nausea, vomiting, diarrhea, and bone or muscle pain.  The patient verbalized understanding of the proper use and possible adverse effects of Libtayo.  All of the patient's questions and concerns were addressed.

## 2023-10-17 ENCOUNTER — RX ONLY (RX ONLY)
Age: 68
End: 2023-10-17

## 2023-10-17 RX ORDER — CALCIPOTRIENE 50 UG/G
OINTMENT TOPICAL
Qty: 120 | Refills: 2 | Status: ERX

## 2023-10-17 RX ORDER — CLOBETASOL PROPIONATE 0.5 MG/G
OINTMENT TOPICAL
Qty: 60 | Refills: 2 | Status: ERX

## 2023-10-17 RX ORDER — FOLIC ACID 1 MG/1
TABLET ORAL
Qty: 30 | Refills: 2 | Status: ERX

## 2023-10-17 RX ORDER — METHOTREXATE SODIUM 2.5 MG/1
TABLET ORAL
Qty: 24 | Refills: 3 | Status: ERX

## 2024-01-23 NOTE — PROCEDURE: HIGH RISK MEDICATION MONITORING
Inas is here today for   Chief Complaint   Patient presents with    Establish Care    Physical    Other     Discuss lab work that was completed on 01/18/24    Anxiety     Wants to discuss anxiety medication and breastfeeding   .        Medication Refills needed today?  NO,   if you receive a prescription today would you like it to be sent to [facility]? NO    Medications: medications verified, no change    Patient would like communication of their results via:  [employer]    Cell Phone:   Telephone Information:   Mobile [phone]     Okay to leave a message containing results? Yes       Health Maintenance Summary       HPV Vaccine (2 - 3-dose series)  Overdue since 9/15/2010    Influenza Vaccine (1)  Overdue since 9/1/2023    COVID-19 Vaccine (4 - 2023-24 season)  Overdue since 9/1/2023    Depression Screening (Yearly)  Next due on 1/17/2025    Cervical Cancer Screening (HPV/Cotest - Every 5 Years)  Next due on 10/8/2025    DTaP/Tdap/Td Vaccine (9 - Td or Tdap)  Next due on 11/1/2029    Meningococcal Vaccine   Aged Out    Pneumococcal Vaccine 0-64   Aged Out            Patient is due for topics as listed above but is not proceeding with Immunization(s) COVID-19, HPV, and Influenza at this time.         Tazorac Counseling:  Patient advised that medication is irritating and drying.  Patient may need to apply sparingly and wash off after an hour before eventually leaving it on overnight.  The patient verbalized understanding of the proper use and possible adverse effects of tazorac.  All of the patient's questions and concerns were addressed.

## 2024-02-27 ENCOUNTER — RX ONLY (RX ONLY)
Age: 69
End: 2024-02-27

## 2024-02-27 RX ORDER — CALCIPOTRIENE 50 UG/G
OINTMENT TOPICAL
Qty: 120 | Refills: 2 | Status: ERX

## 2024-02-27 RX ORDER — METHOTREXATE SODIUM 2.5 MG/1
TABLET ORAL
Qty: 24 | Refills: 1 | Status: ERX

## 2024-04-05 ENCOUNTER — RX ONLY (RX ONLY)
Age: 69
End: 2024-04-05

## 2024-04-05 RX ORDER — METHOTREXATE SODIUM 2.5 MG/1
TABLET ORAL
Qty: 24 | Refills: 0 | Status: ERX

## 2024-04-10 ENCOUNTER — APPOINTMENT (OUTPATIENT)
Dept: URBAN - METROPOLITAN AREA CLINIC 316 | Age: 69
Setting detail: DERMATOLOGY
End: 2024-04-10

## 2024-04-10 ENCOUNTER — LAB SERVICES (OUTPATIENT)
Dept: LAB | Age: 69
End: 2024-04-10
Attending: PHYSICIAN ASSISTANT

## 2024-04-10 DIAGNOSIS — L40.0 PSORIASIS VULGARIS: ICD-10-CM

## 2024-04-10 DIAGNOSIS — L40.0 PSORIASIS VULGARIS: Primary | ICD-10-CM

## 2024-04-10 PROCEDURE — OTHER COSENTYX INITIATION: OTHER

## 2024-04-10 PROCEDURE — OTHER COUNSELING: OTHER

## 2024-04-10 PROCEDURE — 99214 OFFICE O/P EST MOD 30 MIN: CPT

## 2024-04-10 PROCEDURE — OTHER ADDITIONAL NOTES: OTHER

## 2024-04-10 PROCEDURE — 36415 COLL VENOUS BLD VENIPUNCTURE: CPT

## 2024-04-10 PROCEDURE — OTHER ORDER TESTS: OTHER

## 2024-04-10 PROCEDURE — OTHER MIPS QUALITY: OTHER

## 2024-04-10 PROCEDURE — 86480 TB TEST CELL IMMUN MEASURE: CPT

## 2024-04-10 PROCEDURE — OTHER PRESCRIPTION MEDICATION MANAGEMENT: OTHER

## 2024-04-10 ASSESSMENT — LOCATION SIMPLE DESCRIPTION DERM
LOCATION SIMPLE: LEFT ELBOW
LOCATION SIMPLE: RIGHT ELBOW
LOCATION SIMPLE: GLUTEAL CLEFT
LOCATION SIMPLE: PENIS

## 2024-04-10 ASSESSMENT — LOCATION ZONE DERM
LOCATION ZONE: TRUNK
LOCATION ZONE: ARM
LOCATION ZONE: PENIS

## 2024-04-10 ASSESSMENT — LOCATION DETAILED DESCRIPTION DERM
LOCATION DETAILED: DORSAL GLANS
LOCATION DETAILED: RIGHT ELBOW
LOCATION DETAILED: LEFT ELBOW
LOCATION DETAILED: GLUTEAL CLEFT

## 2024-04-10 ASSESSMENT — BSA PSORIASIS: % BODY COVERED IN PSORIASIS: 10

## 2024-04-10 ASSESSMENT — ITCH NUMERIC RATING SCALE: HOW SEVERE IS YOUR ITCHING?: 6

## 2024-04-10 ASSESSMENT — PGA PSORIASIS: PGA PSORIASIS 2020: MODERATE

## 2024-04-10 NOTE — PROCEDURE: ADDITIONAL NOTES
Additional Notes: Last QTB: 4/6/2023\\n\\npt did not take medication for like 3 months.
Render Risk Assessment In Note?: no
Detail Level: Simple

## 2024-04-10 NOTE — PROCEDURE: MIPS QUALITY
Quality 226: Preventive Care And Screening: Tobacco Use: Screening And Cessation Intervention: Patient screened for tobacco use and is an ex/non-smoker
Quality 485: Psoriasis - Improvement In Patient-Reported Itch Severity: Itch severity assessment score was not reduced by at least 3 points from the initial (index) score to the follow-up visit score or assessment was not completed during the follow-up encounter
Quality 134: Screening For Clinical Depression And Follow-Up Plan: The patient was screened for depression and the screen was negative and no follow up required
Detail Level: Generalized
Quality 130: Documentation Of Current Medications In The Medical Record: Current Medications Documented
Quality 431: Preventive Care And Screening: Unhealthy Alcohol Use - Screening: Patient not identified as an unhealthy alcohol user when screened for unhealthy alcohol use using a systematic screening method

## 2024-04-10 NOTE — PROCEDURE: COSENTYX INITIATION
Cosentyx Dosing: 300 mg SC week 0, 1, 2, 3, and 4 then every 4 weeks after that
Detail Level: Zone
Diagnosis (Required): Psoriasis
Add Pregnancy And Lactation Warning To Medication Counseling?: No
Is Cyclosporine Contraindicated?: Yes
Pregnancy And Lactation Warning Text: This medication is Pregnancy Category B and is considered safe during pregnancy. It is unknown if this medication is excreted in breast milk.
Methotrexate Specific Contraindications (Override- The Patient.....): no longer adequately controlled
Cosentyx Monitoring Guidelines: A yearly test for tuberculosis is required while taking Cosentyx.

## 2024-04-10 NOTE — PROCEDURE: PRESCRIPTION MEDICATION MANAGEMENT
Render In Strict Bullet Format?: No
Detail Level: Zone
Continue Regimen: Calcipotriene Oint Monday-Friday \\n Clobetasol oint Saturday and Sunday
Discontinue Regimen: Methotrexate 2.5%

## 2024-04-10 NOTE — PROCEDURE: ORDER TESTS
Performing Laboratory: 0
Bill For Surgical Tray: no
Billing Type: Third-Party Bill
Expected Date Of Service: 04/10/2024

## 2024-04-11 LAB
GAMMA INTERFERON BACKGROUND BLD IA-ACNC: 0.08 IU/ML
M TB IFN-G BLD-IMP: NEGATIVE
M TB IFN-G CD4+ BCKGRND COR BLD-ACNC: 0.02 IU/ML
M TB IFN-G CD4+CD8+ BCKGRND COR BLD-ACNC: 0.01 IU/ML
MITOGEN IGNF BCKGRD COR BLD-ACNC: 9.28 IU/ML

## 2024-04-17 ENCOUNTER — RX ONLY (RX ONLY)
Age: 69
End: 2024-04-17

## 2024-04-17 ENCOUNTER — APPOINTMENT (OUTPATIENT)
Dept: URBAN - METROPOLITAN AREA CLINIC 316 | Age: 69
Setting detail: DERMATOLOGY
End: 2024-04-17

## 2024-04-17 DIAGNOSIS — L40.0 PSORIASIS VULGARIS: ICD-10-CM

## 2024-04-17 PROCEDURE — OTHER COSENTYX INJECTION: OTHER

## 2024-04-17 PROCEDURE — 96372 THER/PROPH/DIAG INJ SC/IM: CPT

## 2024-04-17 RX ORDER — CLOBETASOL PROPIONATE 0.5 MG/G
OINTMENT TOPICAL
Qty: 60 | Refills: 2 | Status: ERX

## 2024-04-17 RX ORDER — CALCIPOTRIENE 50 UG/G
OINTMENT TOPICAL
Qty: 120 | Refills: 2 | Status: ERX

## 2024-04-17 ASSESSMENT — LOCATION ZONE DERM: LOCATION ZONE: LEG

## 2024-04-17 ASSESSMENT — LOCATION DETAILED DESCRIPTION DERM: LOCATION DETAILED: RIGHT ANTERIOR PROXIMAL THIGH

## 2024-04-17 ASSESSMENT — LOCATION SIMPLE DESCRIPTION DERM: LOCATION SIMPLE: RIGHT THIGH

## 2024-04-17 NOTE — PROCEDURE: COSENTYX INJECTION
Include J-Code In Bill: No
Ndc (150mg Pen): [address]-29
Detail Level: Simple
Additional Comments: Pt was given 4 injections to take home to inject at home weekly x 4 weeks.
Ndc (75mg Syringe): [address]-17
Expiration Date (Optional): Aug 2025
Other Time Frame Value: 1 week
Render If Medication Purchased By Clinic In Visit Note?: Yes
Date Of Next Injection: Other Time Frame (Enter Below)
Syringe Size Used (Required For Enhanced Ndc): 300mg/2ml Prefilled Pen
Ndc (300mg Pen): [address]-07
Cosentyx Amount: 300 mg
Consent: The risks of pain and injection site reactions were reviewed with the patient prior to the injection.
Administered By (Optional): Lizzy
Lot # (Optional): [id number]
J-Code: 
Ndc (150mg Syringe): [address]-23

## 2024-05-28 ENCOUNTER — APPOINTMENT (OUTPATIENT)
Dept: URBAN - METROPOLITAN AREA CLINIC 316 | Age: 69
Setting detail: DERMATOLOGY
End: 2024-05-28

## 2024-05-28 DIAGNOSIS — L40.0 PSORIASIS VULGARIS: ICD-10-CM

## 2024-05-28 PROCEDURE — OTHER MIPS QUALITY: OTHER

## 2024-05-28 PROCEDURE — OTHER PRESCRIPTION MEDICATION MANAGEMENT: OTHER

## 2024-05-28 PROCEDURE — OTHER COSENTYX MONITORING: OTHER

## 2024-05-28 PROCEDURE — OTHER COUNSELING: OTHER

## 2024-05-28 PROCEDURE — 99213 OFFICE O/P EST LOW 20 MIN: CPT

## 2024-05-28 ASSESSMENT — LOCATION SIMPLE DESCRIPTION DERM
LOCATION SIMPLE: LEFT ELBOW
LOCATION SIMPLE: RIGHT ELBOW

## 2024-05-28 ASSESSMENT — PGA PSORIASIS: PGA PSORIASIS 2020: ALMOST CLEAR

## 2024-05-28 ASSESSMENT — LOCATION DETAILED DESCRIPTION DERM
LOCATION DETAILED: RIGHT ELBOW
LOCATION DETAILED: LEFT ELBOW

## 2024-05-28 ASSESSMENT — ITCH NUMERIC RATING SCALE: HOW SEVERE IS YOUR ITCHING?: 0

## 2024-05-28 ASSESSMENT — BSA PSORIASIS: % BODY COVERED IN PSORIASIS: 1

## 2024-05-28 ASSESSMENT — LOCATION ZONE DERM: LOCATION ZONE: ARM

## 2024-05-28 NOTE — PROCEDURE: PRESCRIPTION MEDICATION MANAGEMENT
Render In Strict Bullet Format?: No
Detail Level: Zone
Continue Regimen: Calcipotriene Oint Monday-Friday
Discontinue Regimen: Clobetasol oint
Plan: Pt is off methotrexate and folic acid and is happy with the change.

## 2024-05-28 NOTE — PROCEDURE: COSENTYX MONITORING
Latest Quantiferon Gold (Optional): 4/11/2024-negative
Detail Level: Zone
Add High Risk Medication Management Associated Diagnosis?: No

## 2024-06-25 ENCOUNTER — RX ONLY (RX ONLY)
Age: 69
End: 2024-06-25

## 2024-06-25 RX ORDER — SECUKINUMAB 300 MG/2ML
INJECTION SUBCUTANEOUS
Qty: 1 | Refills: 6 | Status: ACTIVE

## 2024-07-20 LAB
CREAT UR-MCNC: 91.1 MG/DL
HBA1C MFR BLD: 6.9 %
MICROALBUMIN UR-MCNC: 3.4
MICROALBUMIN/CREAT UR: 37 MG/G

## 2024-07-22 ENCOUNTER — TELEPHONE (OUTPATIENT)
Dept: OTHER | Age: 69
End: 2024-07-22

## 2024-07-26 DIAGNOSIS — J44.9 COPD (CHRONIC OBSTRUCTIVE PULMONARY DISEASE)  (CMD): Primary | ICD-10-CM

## 2024-09-16 ENCOUNTER — APPOINTMENT (OUTPATIENT)
Dept: URBAN - METROPOLITAN AREA CLINIC 316 | Age: 69
Setting detail: DERMATOLOGY
End: 2024-09-16

## 2024-09-16 DIAGNOSIS — L40.0 PSORIASIS VULGARIS: ICD-10-CM

## 2024-09-16 PROCEDURE — 99213 OFFICE O/P EST LOW 20 MIN: CPT

## 2024-09-16 PROCEDURE — OTHER COUNSELING: OTHER

## 2024-09-16 PROCEDURE — OTHER MIPS QUALITY: OTHER

## 2024-09-16 PROCEDURE — OTHER COSENTYX MONITORING: OTHER

## 2024-09-16 PROCEDURE — OTHER PRESCRIPTION MEDICATION MANAGEMENT: OTHER

## 2024-09-16 ASSESSMENT — LOCATION DETAILED DESCRIPTION DERM
LOCATION DETAILED: LEFT ELBOW
LOCATION DETAILED: RIGHT ELBOW

## 2024-09-16 ASSESSMENT — BSA PSORIASIS: % BODY COVERED IN PSORIASIS: 1

## 2024-09-16 ASSESSMENT — LOCATION ZONE DERM: LOCATION ZONE: ARM

## 2024-09-16 ASSESSMENT — PGA PSORIASIS: PGA PSORIASIS 2020: ALMOST CLEAR

## 2024-09-16 ASSESSMENT — LOCATION SIMPLE DESCRIPTION DERM
LOCATION SIMPLE: LEFT ELBOW
LOCATION SIMPLE: RIGHT ELBOW

## 2024-09-16 ASSESSMENT — ITCH NUMERIC RATING SCALE: HOW SEVERE IS YOUR ITCHING?: 0

## 2024-09-16 NOTE — PROCEDURE: PRESCRIPTION MEDICATION MANAGEMENT
Render In Strict Bullet Format?: No
Detail Level: Zone
Continue Regimen: Cosentyx 300mg Q4wks\\nCalcipotriene Oint Monday-Friday
Plan: Pt is off methotrexate and folic acid and is happy with the change.

## 2024-09-18 ENCOUNTER — RX ONLY (RX ONLY)
Age: 69
End: 2024-09-18

## 2024-09-18 RX ORDER — SECUKINUMAB 300 MG/2ML
INJECTION SUBCUTANEOUS
Qty: 2 | Refills: 11 | Status: ERX | COMMUNITY
Start: 2024-09-18

## 2024-09-20 ENCOUNTER — RX ONLY (RX ONLY)
Age: 69
End: 2024-09-20

## 2024-09-20 RX ORDER — SECUKINUMAB 300 MG/2ML
INJECTION SUBCUTANEOUS
Qty: 2 | Refills: 6

## 2024-11-05 NOTE — PROCEDURE: ADDITIONAL NOTES
Continue with current TF regimen of Boost VHC 4 bolus feeds per day, with water flushes of 60ml before and 180ml after each bolus feed.  60ml water flush prior to medication administration, and 180ml after (2 times per day).   If any GI intolerances occur or weight loss noted prior to our next scheduled phone visit, call this RDN - contact name and number provided.    Recommended RDN closer to where pt lives, as he can see them in person.    
Additional Notes: Last QTB: 4/6/2023\\n\\npt did not take medication for like 3 months.
Render Risk Assessment In Note?: no
Detail Level: Simple

## 2024-12-03 ENCOUNTER — HOSPITAL ENCOUNTER (OUTPATIENT)
Dept: CT IMAGING | Age: 69
Discharge: HOME | End: 2024-12-03
Attending: INTERNAL MEDICINE

## 2024-12-03 DIAGNOSIS — J44.9 COPD (CHRONIC OBSTRUCTIVE PULMONARY DISEASE)  (CMD): ICD-10-CM

## 2024-12-03 PROCEDURE — 71271 CT THORAX LUNG CANCER SCR C-: CPT

## 2024-12-04 ENCOUNTER — TELEPHONE (OUTPATIENT)
Dept: ONCOLOGY | Age: 69
End: 2024-12-04

## 2024-12-06 ENCOUNTER — TELEPHONE (OUTPATIENT)
Dept: ONCOLOGY | Age: 69
End: 2024-12-06

## 2025-01-20 DIAGNOSIS — Z95.1 HX OF CABG: ICD-10-CM

## 2025-01-20 DIAGNOSIS — J44.9 COPD (CHRONIC OBSTRUCTIVE PULMONARY DISEASE)  (CMD): ICD-10-CM

## 2025-01-20 DIAGNOSIS — R91.1 LUNG NODULE: Primary | ICD-10-CM

## 2025-02-04 ENCOUNTER — APPOINTMENT (OUTPATIENT)
Dept: URBAN - METROPOLITAN AREA CLINIC 316 | Age: 70
Setting detail: DERMATOLOGY
End: 2025-02-04

## 2025-02-04 DIAGNOSIS — L30.1 DYSHIDROSIS [POMPHOLYX]: ICD-10-CM

## 2025-02-04 DIAGNOSIS — L40.0 PSORIASIS VULGARIS: ICD-10-CM

## 2025-02-04 PROCEDURE — OTHER PRESCRIPTION: OTHER

## 2025-02-04 PROCEDURE — OTHER PRESCRIPTION MEDICATION MANAGEMENT: OTHER

## 2025-02-04 PROCEDURE — OTHER COSENTYX MONITORING: OTHER

## 2025-02-04 PROCEDURE — 99214 OFFICE O/P EST MOD 30 MIN: CPT

## 2025-02-04 PROCEDURE — OTHER MIPS QUALITY: OTHER

## 2025-02-04 PROCEDURE — OTHER COUNSELING: OTHER

## 2025-02-04 RX ORDER — CLOBETASOL PROPIONATE 0.5 MG/G
OINTMENT TOPICAL
Qty: 60 | Refills: 2 | Status: ERX | COMMUNITY
Start: 2025-02-04

## 2025-02-04 ASSESSMENT — ITCH NUMERIC RATING SCALE: HOW SEVERE IS YOUR ITCHING?: 0

## 2025-02-04 ASSESSMENT — LOCATION DETAILED DESCRIPTION DERM
LOCATION DETAILED: RIGHT RADIAL PALM
LOCATION DETAILED: LEFT ELBOW

## 2025-02-04 ASSESSMENT — LOCATION SIMPLE DESCRIPTION DERM
LOCATION SIMPLE: LEFT ELBOW
LOCATION SIMPLE: RIGHT HAND

## 2025-02-04 ASSESSMENT — LOCATION ZONE DERM
LOCATION ZONE: ARM
LOCATION ZONE: HAND

## 2025-02-04 ASSESSMENT — PGA PSORIASIS: PGA PSORIASIS 2020: ALMOST CLEAR

## 2025-02-04 ASSESSMENT — BSA PSORIASIS: % BODY COVERED IN PSORIASIS: 1

## 2025-02-04 ASSESSMENT — SEVERITY ASSESSMENT 2020: SEVERITY 2020: SEVERE

## 2025-02-04 ASSESSMENT — BSA RASH: BSA RASH: 2

## 2025-02-04 NOTE — PROCEDURE: PRESCRIPTION MEDICATION MANAGEMENT
Render In Strict Bullet Format?: No
Detail Level: Zone
Continue Regimen: Cosentyx 300mg Q4wks\\nCalcipotriene Oint Monday-Friday

## 2025-03-18 ENCOUNTER — HOSPITAL ENCOUNTER (OUTPATIENT)
Dept: CT IMAGING | Age: 70
Discharge: HOME | End: 2025-03-18
Attending: INTERNAL MEDICINE

## 2025-03-18 ENCOUNTER — APPOINTMENT (OUTPATIENT)
Dept: URBAN - METROPOLITAN AREA CLINIC 316 | Age: 70
Setting detail: DERMATOLOGY
End: 2025-03-18

## 2025-03-18 ENCOUNTER — RX ONLY (RX ONLY)
Age: 70
End: 2025-03-18

## 2025-03-18 DIAGNOSIS — L40.0 PSORIASIS VULGARIS: ICD-10-CM

## 2025-03-18 DIAGNOSIS — R91.1 LUNG NODULE: ICD-10-CM

## 2025-03-18 DIAGNOSIS — Z95.1 HX OF CABG: ICD-10-CM

## 2025-03-18 DIAGNOSIS — J44.9 COPD (CHRONIC OBSTRUCTIVE PULMONARY DISEASE)  (CMD): ICD-10-CM

## 2025-03-18 DIAGNOSIS — L30.1 DYSHIDROSIS [POMPHOLYX]: ICD-10-CM

## 2025-03-18 PROCEDURE — 99214 OFFICE O/P EST MOD 30 MIN: CPT

## 2025-03-18 PROCEDURE — OTHER ORDER TESTS: OTHER

## 2025-03-18 PROCEDURE — OTHER PRESCRIPTION: OTHER

## 2025-03-18 PROCEDURE — OTHER MIPS QUALITY: OTHER

## 2025-03-18 PROCEDURE — OTHER COUNSELING: OTHER

## 2025-03-18 PROCEDURE — OTHER PRESCRIPTION MEDICATION MANAGEMENT: OTHER

## 2025-03-18 PROCEDURE — OTHER COSENTYX MONITORING: OTHER

## 2025-03-18 PROCEDURE — 71250 CT THORAX DX C-: CPT

## 2025-03-18 RX ORDER — TACROLIMUS 1 MG/G
OINTMENT TOPICAL
Qty: 60 | Refills: 2 | Status: ERX | COMMUNITY
Start: 2025-03-18

## 2025-03-18 RX ORDER — CLOBETASOL PROPIONATE 0.5 MG/G
OINTMENT TOPICAL
Qty: 60 | Refills: 2 | Status: CANCELLED
Stop reason: SDUPTHER

## 2025-03-18 ASSESSMENT — LOCATION DETAILED DESCRIPTION DERM
LOCATION DETAILED: LEFT ELBOW
LOCATION DETAILED: RIGHT RADIAL PALM

## 2025-03-18 ASSESSMENT — LOCATION ZONE DERM
LOCATION ZONE: ARM
LOCATION ZONE: HAND

## 2025-03-18 ASSESSMENT — LOCATION SIMPLE DESCRIPTION DERM
LOCATION SIMPLE: LEFT ELBOW
LOCATION SIMPLE: RIGHT HAND

## 2025-03-18 NOTE — PROCEDURE: MIPS QUALITY
Quality 487: Screening For Social Drivers Of Health: Patient not screened for food insecurity, housing instability, transportation needs, utility difficulties, and interpersonal safety
Quality 127: Diabetic Foot And Ankle Care, Ulcer Prevention - Evaluation Of Footwear: Footwear Evaluation not Performed
Quality 134: Screening For Clinical Depression And Follow-Up Plan: The patient was screened for depression and the screen was negative and no follow up required
Quality 128: Preventive Care And Screening: Body Mass Index (Bmi) Screening And Follow-Up Plan: BMI is documented within normal parameters and no follow-up plan is required.
Quality 387: Annual Hcv Screening For Patients Who Are Active Injection Drug Users: Screening for HCV infection not received within the 12 month reporting period, reason not given
Quality 143: Oncology: Medical And Radiation- Pain Intensity Quantified: Pain severity not assessed.
Quality 357: Surgical Site Infection (Ssi): No surgical site infection
Quality 400a: One-Time Screening For Hepatitis C Virus (Hcv) And Treatment Initiation: Patient does not receive HCV antibody test OR patient does receive HCV antibody test but results not documented, reason not given
Quality 155 Part A: Falls: Risk Assessment: Falls risk assessment not completed, reason not otherwise specified
Quality 1: Diabetes Hemoglobin A1c Poor Control: Hemoglobin A1C was not recorded
Quality 205 Hiv/Aids: Sexually Transmitted Disease Screening For Chlamydia, Gonorrhea, And Syphilis: Chlamydia, gonorrhea, and syphilis screening results not documented as performed, reason not otherwise specified
Quality 126: Diabetes Mellitus: Diabetic Foot And Ankle Care, Peripheral Neuropathy - Neurological Evaluation: Lower Extremity Neurological Exam not Performed
Quality 226: Preventive Care And Screening: Tobacco Use: Screening And Cessation Intervention: Patient screened for tobacco use and is an ex/non-smoker
Quality 47: Advance Care Plan: Advance care planning not documented, reason not otherwise specified.
Detail Level: Generalized
Quality 130: Documentation Of Current Medications In The Medical Record: Current Medications Documented
Quality 358: Patient-Centered Surgical Risk Assessment And Communication: A patient-specific risk assessment with a risk calculator was not completed or communicated to patient and/or family.

## 2025-03-18 NOTE — PROCEDURE: ORDER TESTS
Bill For Surgical Tray: no
Performing Laboratory: [id number]
Expected Date Of Service: 03/18/2025
Billing Type: Third-Party Bill

## 2025-03-18 NOTE — PROCEDURE: PRESCRIPTION MEDICATION MANAGEMENT
Render In Strict Bullet Format?: No
Samples Given: Cosentyx 300mg/mL
Detail Level: Zone
Continue Regimen: Cosentyx 300mg Q4wks\\nCalcipotriene Oint Monday-Friday
Plan: Will call [employer] to check status of Cosentyx rx.
Initiate Treatment: Tacrolimus 0.1% BID to the hands
48

## 2025-03-26 ENCOUNTER — RX ONLY (RX ONLY)
Age: 70
End: 2025-03-26

## 2025-03-26 RX ORDER — SECUKINUMAB 300 MG/2ML
INJECTION SUBCUTANEOUS
Qty: 2 | Refills: 6

## 2025-04-21 ENCOUNTER — CLINICAL ABSTRACT (OUTPATIENT)
Age: 70
End: 2025-04-21

## 2025-04-29 ENCOUNTER — APPOINTMENT (OUTPATIENT)
Dept: URBAN - METROPOLITAN AREA CLINIC 316 | Age: 70
Setting detail: DERMATOLOGY
End: 2025-04-29

## 2025-04-29 DIAGNOSIS — L30.1 DYSHIDROSIS [POMPHOLYX]: ICD-10-CM

## 2025-04-29 DIAGNOSIS — J44.9 COPD (CHRONIC OBSTRUCTIVE PULMONARY DISEASE)  (CMD): Primary | ICD-10-CM

## 2025-04-29 DIAGNOSIS — R91.1 LUNG NODULE: ICD-10-CM

## 2025-04-29 DIAGNOSIS — L40.0 PSORIASIS VULGARIS: ICD-10-CM

## 2025-04-29 PROCEDURE — OTHER COUNSELING: OTHER

## 2025-04-29 PROCEDURE — OTHER MIPS QUALITY: OTHER

## 2025-04-29 PROCEDURE — 99214 OFFICE O/P EST MOD 30 MIN: CPT

## 2025-04-29 PROCEDURE — OTHER COSENTYX MONITORING: OTHER

## 2025-04-29 PROCEDURE — OTHER ADDITIONAL NOTES: OTHER

## 2025-04-29 PROCEDURE — OTHER ORDER TESTS: OTHER

## 2025-04-29 PROCEDURE — OTHER PRESCRIPTION MEDICATION MANAGEMENT: OTHER

## 2025-04-29 ASSESSMENT — PGA PSORIASIS: PGA PSORIASIS 2020: CLEAR

## 2025-04-29 ASSESSMENT — LOCATION SIMPLE DESCRIPTION DERM
LOCATION SIMPLE: LEFT ELBOW
LOCATION SIMPLE: RIGHT HAND

## 2025-04-29 ASSESSMENT — LOCATION DETAILED DESCRIPTION DERM
LOCATION DETAILED: LEFT ELBOW
LOCATION DETAILED: RIGHT RADIAL PALM

## 2025-04-29 ASSESSMENT — LOCATION ZONE DERM
LOCATION ZONE: ARM
LOCATION ZONE: HAND

## 2025-04-29 ASSESSMENT — BSA PSORIASIS: % BODY COVERED IN PSORIASIS: 0

## 2025-04-29 ASSESSMENT — ITCH NUMERIC RATING SCALE: HOW SEVERE IS YOUR ITCHING?: 0

## 2025-04-29 NOTE — PROCEDURE: ORDER TESTS
Bill For Surgical Tray: no
Performing Laboratory: [id number]
Expected Date Of Service: 03/18/2025
Billing Type: Third-Party Bill

## 2025-04-29 NOTE — PROCEDURE: ADDITIONAL NOTES
Additional Notes: Pt is well controlled with topicals. Pt is approved for Cosentyx, but is waiting for pharmacy to ship pen.
Render Risk Assessment In Note?: no
Detail Level: Simple

## 2025-04-29 NOTE — PROCEDURE: COUNSELING
Quality 410: Psoriasis Clinical Response To Oral Systemic Or Biologic Medications: Patient has been on biologic or system therapy for less than 6 months
Detail Level: Zone
Quality 176: Tuberculosis Screening Prior To First Course Of Biologic And/Or Immune Response Modifier Therapy: Patient receiving first-time biologic and/or immune response modifier therapy, TB Screening Performed and Results Interpreted within 12 months
Nelda Randolph

## 2025-04-29 NOTE — PROCEDURE: PRESCRIPTION MEDICATION MANAGEMENT
Render In Strict Bullet Format?: No
Samples Given: - Cosentyx 300mg/mL (pt injects every month on the 14th)
Modify Regimen: Pt is well controlled using only tacrolimus topically with cosentyx but can resume calcipotriene if needed.\\n\\n- Calcipotriene Oint Monday-Friday\\n*
Detail Level: Zone
Continue Regimen: * \\n- Tacrolimus 0.1% BID to the hands\\n- Cosentyx 300mg Q4wks
Plan: Will call [employer] to check status of Cosentyx rx.
Continue Regimen: Tacrolimus bid

## 2025-05-29 DIAGNOSIS — R91.1 LUNG NODULE: Primary | ICD-10-CM

## 2025-05-29 DIAGNOSIS — J44.1 COPD EXACERBATION  (CMD): ICD-10-CM

## 2025-05-31 ENCOUNTER — LAB SERVICES (OUTPATIENT)
Dept: LAB | Age: 70
End: 2025-05-31
Attending: PHYSICIAN ASSISTANT

## 2025-05-31 DIAGNOSIS — L40.0 PSORIASIS VULGARIS: Primary | ICD-10-CM

## 2025-05-31 DIAGNOSIS — T49.0X5A ADVERSE EFFECT OF LOCAL ANTIFUNGAL, ANTI-INFECTIVE AND ANTI-INFLAMMATORY DRUGS, INITIAL ENCOUNTER: ICD-10-CM

## 2025-05-31 DIAGNOSIS — L81.0 POSTINFLAMMATORY HYPERPIGMENTATION: ICD-10-CM

## 2025-05-31 PROCEDURE — 86480 TB TEST CELL IMMUN MEASURE: CPT

## 2025-05-31 PROCEDURE — 36415 COLL VENOUS BLD VENIPUNCTURE: CPT

## 2025-06-01 LAB
GAMMA INTERFERON BACKGROUND BLD IA-ACNC: 0.02 IU/ML
M TB IFN-G BLD-IMP: NEGATIVE
M TB IFN-G CD4+ BCKGRND COR BLD-ACNC: 0.01 IU/ML
M TB IFN-G CD4+CD8+ BCKGRND COR BLD-ACNC: 0.01 IU/ML
MITOGEN IGNF BCKGRD COR BLD-ACNC: 4.42 IU/ML

## 2025-06-10 ENCOUNTER — APPOINTMENT (OUTPATIENT)
Dept: URBAN - METROPOLITAN AREA CLINIC 316 | Age: 70
Setting detail: DERMATOLOGY
End: 2025-06-10

## 2025-06-10 DIAGNOSIS — L40.0 PSORIASIS VULGARIS: ICD-10-CM

## 2025-06-10 DIAGNOSIS — L30.1 DYSHIDROSIS [POMPHOLYX]: ICD-10-CM

## 2025-06-10 PROCEDURE — OTHER COSENTYX MONITORING: OTHER

## 2025-06-10 PROCEDURE — OTHER MIPS QUALITY: OTHER

## 2025-06-10 PROCEDURE — OTHER COUNSELING: OTHER

## 2025-06-10 PROCEDURE — 99214 OFFICE O/P EST MOD 30 MIN: CPT

## 2025-06-10 PROCEDURE — OTHER PRESCRIPTION MEDICATION MANAGEMENT: OTHER

## 2025-06-10 ASSESSMENT — BSA RASH: BSA RASH: 1

## 2025-06-10 ASSESSMENT — LOCATION DETAILED DESCRIPTION DERM
LOCATION DETAILED: LEFT ELBOW
LOCATION DETAILED: RIGHT RADIAL PALM

## 2025-06-10 ASSESSMENT — LOCATION ZONE DERM
LOCATION ZONE: ARM
LOCATION ZONE: HAND

## 2025-06-10 ASSESSMENT — BSA PSORIASIS: % BODY COVERED IN PSORIASIS: 3

## 2025-06-10 ASSESSMENT — LOCATION SIMPLE DESCRIPTION DERM
LOCATION SIMPLE: RIGHT HAND
LOCATION SIMPLE: LEFT ELBOW

## 2025-06-10 ASSESSMENT — PGA PSORIASIS: PGA PSORIASIS 2020: ALMOST CLEAR

## 2025-06-10 ASSESSMENT — SEVERITY ASSESSMENT 2020: SEVERITY 2020: ALMOST CLEAR

## 2025-06-10 ASSESSMENT — ITCH NUMERIC RATING SCALE: HOW SEVERE IS YOUR ITCHING?: 2

## 2025-06-24 ENCOUNTER — LAB SERVICES (OUTPATIENT)
Dept: LAB | Age: 70
End: 2025-06-24
Attending: FAMILY MEDICINE

## 2025-06-24 DIAGNOSIS — E78.2 MIXED HYPERLIPIDEMIA: ICD-10-CM

## 2025-06-24 DIAGNOSIS — D50.9 IRON DEFICIENCY ANEMIA, UNSPECIFIED: ICD-10-CM

## 2025-06-24 DIAGNOSIS — F41.9 ANXIETY DISORDER, UNSPECIFIED: Primary | ICD-10-CM

## 2025-06-24 DIAGNOSIS — E11.9 TYPE 2 DIABETES MELLITUS WITHOUT COMPLICATIONS (CMD): ICD-10-CM

## 2025-06-24 DIAGNOSIS — R31.21 ASYMPTOMATIC MICROSCOPIC HEMATURIA: ICD-10-CM

## 2025-06-24 DIAGNOSIS — I25.10 ATHEROSCLEROTIC HEART DISEASE OF NATIVE CORONARY ARTERY WITHOUT ANGINA PECTORIS: ICD-10-CM

## 2025-06-24 DIAGNOSIS — Z12.5 ENCOUNTER FOR SCREENING FOR MALIGNANT NEOPLASM OF PROSTATE: ICD-10-CM

## 2025-06-24 DIAGNOSIS — R10.11 RIGHT UPPER QUADRANT PAIN: ICD-10-CM

## 2025-06-24 LAB
ALBUMIN SERPL-MCNC: 3.5 G/DL (ref 3.4–5)
ALBUMIN/GLOB SERPL: 0.9 {RATIO} (ref 1–2.4)
ALP SERPL-CCNC: 73 UNITS/L (ref 45–117)
ALT SERPL-CCNC: 26 UNITS/L
ANION GAP SERPL CALC-SCNC: 8 MMOL/L (ref 7–19)
APPEARANCE UR: CLEAR
AST SERPL-CCNC: 19 UNITS/L
BACTERIA #/AREA URNS HPF: ABNORMAL /HPF
BASOPHILS # BLD: 0 K/MCL (ref 0–0.3)
BASOPHILS NFR BLD: 0 %
BILIRUB SERPL-MCNC: 0.6 MG/DL (ref 0.2–1)
BILIRUB UR QL STRIP: NEGATIVE
BUN SERPL-MCNC: 25 MG/DL (ref 6–20)
BUN/CREAT SERPL: 22 (ref 7–25)
CALCIUM SERPL-MCNC: 9.4 MG/DL (ref 8.4–10.2)
CHLORIDE SERPL-SCNC: 106 MMOL/L (ref 97–110)
CHOLEST SERPL-MCNC: 167 MG/DL
CHOLEST/HDLC SERPL: 3 {RATIO}
CO2 SERPL-SCNC: 25 MMOL/L (ref 21–32)
COLOR UR: ABNORMAL
CREAT SERPL-MCNC: 1.16 MG/DL (ref 0.67–1.17)
CREAT UR-MCNC: 185 MG/DL
DEPRECATED RDW RBC: 47.8 FL (ref 39–50)
EGFRCR SERPLBLD CKD-EPI 2021: 67 ML/MIN/{1.73_M2}
EOSINOPHIL # BLD: 0.2 K/MCL (ref 0–0.5)
EOSINOPHIL NFR BLD: 2 %
ERYTHROCYTE [DISTWIDTH] IN BLOOD: 14.7 % (ref 11–15)
FASTING DURATION TIME PATIENT: ABNORMAL H
FERRITIN SERPL-MCNC: 62 NG/ML (ref 26–388)
GLOBULIN SER-MCNC: 4 G/DL (ref 2–4)
GLUCOSE SERPL-MCNC: 129 MG/DL (ref 70–99)
GLUCOSE UR STRIP-MCNC: NEGATIVE MG/DL
HBA1C MFR BLD: 7 % (ref 4.5–5.6)
HCT VFR BLD CALC: 42.5 % (ref 39–51)
HDLC SERPL-MCNC: 56 MG/DL
HGB BLD-MCNC: 13.5 G/DL (ref 13–17)
HGB UR QL STRIP: NEGATIVE
HYALINE CASTS #/AREA URNS LPF: ABNORMAL /LPF
IMM GRANULOCYTES # BLD AUTO: 0 K/MCL (ref 0–0.2)
IMM GRANULOCYTES # BLD: 0 %
IRON SATN MFR SERPL: 10 % (ref 15–45)
IRON SERPL-MCNC: 45 MCG/DL (ref 65–175)
KETONES UR STRIP-MCNC: NEGATIVE MG/DL
LDLC SERPL CALC-MCNC: 90 MG/DL
LEUKOCYTE ESTERASE UR QL STRIP: ABNORMAL
LYMPHOCYTES # BLD: 1.1 K/MCL (ref 1–4)
LYMPHOCYTES NFR BLD: 14 %
MCH RBC QN AUTO: 28.3 PG (ref 26–34)
MCHC RBC AUTO-ENTMCNC: 31.8 G/DL (ref 32–36.5)
MCV RBC AUTO: 89.1 FL (ref 78–100)
MICROALBUMIN UR-MCNC: 23.2 MG/DL
MICROALBUMIN/CREAT UR: 125.4 MG/G
MONOCYTES # BLD: 0.6 K/MCL (ref 0.3–0.9)
MONOCYTES NFR BLD: 9 %
MUCOUS THREADS URNS QL MICRO: PRESENT
NEUTROPHILS # BLD: 5.5 K/MCL (ref 1.8–7.7)
NEUTROPHILS NFR BLD: 75 %
NITRITE UR QL STRIP: NEGATIVE
NONHDLC SERPL-MCNC: 111 MG/DL
NRBC BLD MANUAL-RTO: 0 /100 WBC
PH UR STRIP: 5.5 [PH] (ref 5–7)
PLATELET # BLD AUTO: 152 K/MCL (ref 140–450)
POTASSIUM SERPL-SCNC: 4.2 MMOL/L (ref 3.4–5.1)
PROT SERPL-MCNC: 7.5 G/DL (ref 6.4–8.2)
PROT UR STRIP-MCNC: ABNORMAL MG/DL
PSA SERPL-MCNC: 0.94 NG/ML
RBC # BLD: 4.77 MIL/MCL (ref 4.5–5.9)
RBC #/AREA URNS HPF: ABNORMAL /HPF
SODIUM SERPL-SCNC: 135 MMOL/L (ref 135–145)
SP GR UR STRIP: 1.02 (ref 1–1.03)
SQUAMOUS #/AREA URNS HPF: ABNORMAL /HPF
TIBC SERPL-MCNC: 449 MCG/DL (ref 250–450)
TRANSFERRIN SERPL-MCNC: 366 MG/DL (ref 200–360)
TRIGL SERPL-MCNC: 105 MG/DL
TSH SERPL-ACNC: 2.87 MCUNITS/ML (ref 0.35–5)
UROBILINOGEN UR STRIP-MCNC: 0.2 MG/DL
WBC # BLD: 7.4 K/MCL (ref 4.2–11)
WBC #/AREA URNS HPF: ABNORMAL /HPF

## 2025-06-24 PROCEDURE — 85025 COMPLETE CBC W/AUTO DIFF WBC: CPT

## 2025-06-24 PROCEDURE — 83036 HEMOGLOBIN GLYCOSYLATED A1C: CPT

## 2025-06-24 PROCEDURE — 80061 LIPID PANEL: CPT

## 2025-06-24 PROCEDURE — 82043 UR ALBUMIN QUANTITATIVE: CPT

## 2025-06-24 PROCEDURE — 84153 ASSAY OF PSA TOTAL: CPT

## 2025-06-24 PROCEDURE — 84443 ASSAY THYROID STIM HORMONE: CPT

## 2025-06-24 PROCEDURE — 80053 COMPREHEN METABOLIC PANEL: CPT

## 2025-06-24 PROCEDURE — 81001 URINALYSIS AUTO W/SCOPE: CPT

## 2025-06-24 PROCEDURE — 82728 ASSAY OF FERRITIN: CPT

## 2025-06-24 PROCEDURE — 83540 ASSAY OF IRON: CPT

## 2025-06-24 PROCEDURE — 84466 ASSAY OF TRANSFERRIN: CPT

## 2025-06-24 PROCEDURE — 36415 COLL VENOUS BLD VENIPUNCTURE: CPT
